# Patient Record
(demographics unavailable — no encounter records)

---

## 2024-12-03 NOTE — DISCUSSION/SUMMARY
[FreeTextEntry1] : Patient has medical history detailed above and active medical issues including:  - HFrEF, LVEF 38% echo July 2024, on GDMT followed by CHF team  - Dyspnea on exertion, abnormal EKG, multiple CAD risk factors.  Moderate ischemia anterior anterolateral with reduced LVEF.  Nonischemic catheterization May 2024  - Hypertension, BP at guideline goal on Coreg, Entresto.  Patient has high co-pay on Entresto and will discuss with CHF team next OV in 1 week  - Mildly enlarged proximal abdominal aorta 3.3 cm.   - Metastatic prostate CA with CKD followed by nephrologist Dr. Love and oncologist.  - Incidental finding thyroid nodule, thyroid ultrasound and endocrine follow-up Dr. Amaya ordered.  Advised patient to follow active lifestyle with regular cardiovascular exercise. Patient educated on heart healthy diet. Recommend increased oral hydration with electrolyte supplement drinks, avoid excess alcohol and caffeine.  Patient is aware to call with any symptoms or concerns.  Patient will be seen in cardiology follow-up 6 months   Junito will follow-up with Dr. Biju Palomo for primary care.  Total time spent 45 minutes, reviewing of test results, chart information, patient discussion, physical exam and completion of chart documentation.

## 2024-12-03 NOTE — REASON FOR VISIT
[Other: ____] : [unfilled] [FreeTextEntry1] : Junito has a past medical history of HTN on amlodipine, hypothyroid on Synthroid, metastatic prostate CA history of CyberKnife prostate surgery 2007, JARON 2005, nonischemic CMP LVEF 30-35% April 2024, nonobstructive catheterization May 2024  Seen in the office today with his wife who is able to confirm accurate history.  Patient is a good historian.  No history of CAD, MI, revascularization, VHD, CHF, TIA, CVA, diabetes, PVD, DVT, PE, arrhythmia, AF.  Mild pedal edema improved off of amlodipine.  Patient has dyspnea with moderate exertion.  Cardiovascular review of symptoms is negative for exertional chest pain, dyspnea, palpitations, dizziness or syncope.  No PND or orthopnea.  No bleeding or black stool.  Gait abnormality limited ambulation with a walker or cane.   Cardiac catheterization May 2024 nonobstructive coronaries  Lexiscan Myoview stress test April 2024 LVEF 41%, moderate ischemia anterior and anterior lateral walls  Echocardiogram April 2024 LVEF 30-35%, mild to moderate AS and MR.  Carotid and abdominal ultrasound April 2024, mild nonobstructive plaque, proximal abdominal aortic size 3.3 cm, thyroid nodule seen, thyroid ultrasound and endocrine follow-up ordered.  EKG March 2024 sinus rhythm RBBB occasional PVC

## 2024-12-03 NOTE — PHYSICAL EXAM

## 2024-12-06 NOTE — ADDENDUM
[FreeTextEntry1] : 9/5/2024 Patient called office informing us of a [place] on 9[place]10/24 [place] [facility] [place] Based on my visit from 8/7/24 with patient, he is optimized from a cardiovascular perspective. Risk not attenuated with further CV testing.  Prior testing as outlined above.  He is not on any blood thinner that would need to be held.

## 2024-12-06 NOTE — ASSESSMENT
[FreeTextEntry1] : 84 yo M with HFrEF 2/2 NICM, CKD, HTN, nonobstructive CAD, hypothyroidism, and hx of metastatic prostate cancer (maintained on enzalutamide) presents today for follow-up.  He has ACC/AHA stage C cardiomyopathy with NYHA class I-II symptoms.

## 2024-12-06 NOTE — DISCUSSION/SUMMARY
[FreeTextEntry1] : # HFrEF   - ETIOLOGY: -Nonischemic cardiomyopathy as per [facility] on 5/10/2024. -mild AS based on  SE with 30mcg max JOAN of 1.7cm2 of Vmax 2.2m/s and MG 8.4mmHg. - AL labs: K/L ratio of 1.63, no M spike, JAYNA not indicated indicated.   -PYP amyloid scan with equivocal results-grade 2 uptake, H: CL ratio of 1.45.   -Cardiac MRI ordered today. -Genetic screening for hereditary TTR amyloid sent today.   - GDMT: - continue on coreg 12.5mg BID and Jardiance 10 mg daily. - INCREASE Entresto to 49-51mg BID.   - would like to add on MRA, if renal function permits.  - DIURETICS: Euvolemic on exam.  Continue off of standing diuretics.   - LABS: -24: K4.7, Cr 2.28, eGFR 28, NT proBNP 13,435. -Needs repeat blood work, patient notes he is going for labs later this week with his oncologist.   - DEVICE: None in place.  Echocardiogram with LV function improvement from 30 to 38%.  Has not been typically considered a meaningful change.  Will evaluate LV function closer with cardiac MRI.  We are still uptitrating his GDMT.  Of note, patient has an RBBB with a CT interval of 216.  This is borderline criteria for consideration of a CRT-D.   - EDUCATION: HF education provided including lifestyle changes (low Na diet, fluid restriction, increase physical activity), current clinical condition, natural progression and prognosis.  F/U with me after cardiac MRI.

## 2024-12-06 NOTE — CARDIOLOGY SUMMARY
[de-identified] : 6/17/2024: NSR, HR 60, RBBB, . [de-identified] :  SE 7/3/24: LVEF 30% at rest and peak, at 30mcg JOAN 1.7cm with MG 8mmHg, Vmax 2.2. REst: V max 1.6, MG 4.9mmHG JOAN 1.5cm2. Nuclear SPECT 24: LVEF 41%, med sized mild-mod anterior - anterolateral wall reversible defect. [de-identified] : 7/26/2024: LVEF 38%, LVEDD 6.3 cm, IVSd 1.2 cm, PWD 1.5 cm, normal RV, severe LAE, mild TR, PASP 47 mmHg, mild left pleural effusion. 4/16/2024: LVEF 30 to 35%, LVEDD 6.3 cm, IVSD 1.3 cm, PWD 1.3 cm, severe LAE, mild-mod YUDY, mild to mod AS, low-flow low gradient AS (SV 48.7mL, SVi 22.21mL/m2), Vmax 1.7, MG 7.5mmHg, JOAN 1.4, CI 0.44, mild - mod TR, PASP 41mmHg, mild- mod MR. [de-identified] : PYP amyloid scan 7/24/2024: Grade 2 uptake, H: CL ratio of 1.45. [de-identified] : [facility] 5/10/2024: Luminal irregularities, no obstructive disease.  LVEDP 20 mmHg.

## 2024-12-06 NOTE — HISTORY OF PRESENT ILLNESS
[FreeTextEntry1] : 84 yo M with HFrEF 2/2 NICM, CKD, HTN, nonobstructive CAD, hypothyroidism, and hx of metastatic prostate cancer (maintained on enzalutamide) presents today for follow-up.  Patient notes that for the preceding 3 to 4 months he began feeling quite short of breath on getting into bed with subsequent orthopnea.  This was accompanied by mild pedal edema.  He was seen by Dr. Valentino and taken off amlodipine.  Further cardiac workup revealed echocardiogram in April 2024 with depressed ejection fraction of 30 to 35% with mild to moderate AS concerning for low-flow AS and mild to moderate MR.  Subsequent nuclear SPECT showed moderate ischemia in the anterior and anterolateral walls.  He subsequently was referred for cardiac catheterization on 5/10/2024.  This revealed luminal irregularities with no obstructive disease and elevated LVEDP of 20 mmHg.  He was started on Jardiance 10 mg daily.  He was referred to heart failure team for additional evaluation of nonischemic cardiomyopathy.  Dobutamine stress echo was done on 7/3/24 that showed fixed mild AS with LVEF of 30%.     I last saw him on 7/11/2024.  Started him on Entresto 24-26 mg twice daily.  He also underwent amyloid work up.  PYP scan on 7/24/24 with equivocal results of H: CL ratio of 1.45 and grade 2 uptake.    Today he reports that he is symptom-free.  He no longer has orthopnea and his lower extremity edema is completely resolved.  At baseline, he walks with a walker and has no difficulty walking around a supermarket.  He does have underlying spinal stenosis.  He denies carpal tunnel as well as neuropathy.  He denies chest pain, palpitations, dyspnea at rest or exertion, orthopnea, leg swelling, changes in appetite, changes in weight, bendopnea, lightheadedness, dizziness, and syncope/pre-syncope.  Not checking his BP or weight.  Office weight stable 170 lbs.

## 2024-12-06 NOTE — PHYSICAL EXAM
[Normal S1, S2] : normal S1, S2 [Normal] : soft, non-tender, no masses/organomegaly, normal bowel sounds [No Edema] : no edema [No Rash] : no rash [Moves all extremities] : moves all extremities [Alert and Oriented] : alert and oriented [de-identified] : JVP at clavicle. [de-identified] : no murmur [de-identified] : in wheelchair today [de-identified] : warm

## 2025-01-13 NOTE — PHYSICAL EXAM
[Normal S1, S2] : normal S1, S2 [Normal] : soft, non-tender, no masses/organomegaly, normal bowel sounds [No Edema] : no edema [No Rash] : no rash [Moves all extremities] : moves all extremities [Alert and Oriented] : alert and oriented [de-identified] : JVP at clavicle. [de-identified] : no murmur [de-identified] : in wheelchair today [de-identified] : warm

## 2025-01-13 NOTE — HISTORY OF PRESENT ILLNESS
[FreeTextEntry1] : 85 yo M with HFrEF 2/2 NICM, CKD, HTN, nonobstructive CAD, hypothyroidism, and hx of metastatic prostate cancer (maintained on enzalutamide) presents today for follow-up.  Patient notes that for the preceding 3 to 4 months he began feeling quite short of breath on getting into bed with subsequent orthopnea.  This was accompanied by mild pedal edema.  He was seen by Dr. Valentino and taken off amlodipine.  Further cardiac workup revealed echocardiogram in April 2024 with depressed ejection fraction of 30 to 35% with mild to moderate AS concerning for low-flow AS and mild to moderate MR.  Subsequent nuclear SPECT showed moderate ischemia in the anterior and anterolateral walls.  He subsequently was referred for cardiac catheterization on 5/10/2024.  This revealed luminal irregularities with no obstructive disease and elevated LVEDP of 20 mmHg.  He was started on Jardiance 10 mg daily.  He was referred to heart failure team for additional evaluation of nonischemic cardiomyopathy.  Dobutamine stress echo was done on 7/3/24 that showed fixed mild AS with LVEF of 30%.     I last saw him on 7/11/2024.  Started him on Entresto 24-26 mg twice daily.  He also underwent amyloid work up.  PYP scan on 7/24/24 with equivocal results of H: CL ratio of 1.45 and grade 2 uptake.    Today he reports that he is symptom-free.  He no longer has orthopnea and his lower extremity edema is completely resolved.  At baseline, he walks with a walker and has no difficulty walking around a supermarket.  He does have underlying spinal stenosis.  He denies carpal tunnel as well as neuropathy.  He denies chest pain, palpitations, dyspnea at rest or exertion, orthopnea, leg swelling, changes in appetite, changes in weight, bendopnea, lightheadedness, dizziness, and syncope/pre-syncope.  Not checking his BP or weight.  Office weight stable 170 lbs.

## 2025-04-10 NOTE — PHYSICAL EXAM
[General Appearance - Alert] : alert [General Appearance - In No Acute Distress] : in no acute distress [Sclera] : the sclera and conjunctiva were normal [PERRL With Normal Accommodation] : pupils were equal in size, round, and reactive to light [Extraocular Movements] : extraocular movements were intact [Outer Ear] : the ears and nose were normal in appearance [Oropharynx] : the oropharynx was normal [Neck Appearance] : the appearance of the neck was normal [Neck Cervical Mass (___cm)] : no neck mass was observed [Jugular Venous Distention Increased] : there was no jugular-venous distention [Thyroid Diffuse Enlargement] : the thyroid was not enlarged [Thyroid Nodule] : there were no palpable thyroid nodules [Auscultation Breath Sounds / Voice Sounds] : lungs were clear to auscultation bilaterally [Heart Rate And Rhythm] : heart rate was normal and rhythm regular [Heart Sounds] : normal S1 and S2 [Heart Sounds Gallop] : no gallops [Murmurs] : no murmurs [Heart Sounds Pericardial Friction Rub] : no pericardial rub [Bowel Sounds] : normal bowel sounds [Abdomen Soft] : soft [Abdomen Tenderness] : non-tender [] : no hepato-splenomegaly [Abdomen Mass (___ Cm)] : no abdominal mass palpated [Oriented To Time, Place, And Person] : oriented to person, place, and time [Impaired Insight] : insight and judgment were intact [Affect] : the affect was normal [FreeTextEntry1] : dry

## 2025-04-10 NOTE — ASSESSMENT
[FreeTextEntry1] : 1) CKD IV 2) UTI; MRSA 3) HTN 4) CHF  Maintain on current regimen; entresto; carvedilol; farxiga; flomax On xtani for prostate ca On vanco until May per ID Will refer to Dr Chaudhry for urology Will get vascular US doppler for LE r/o DVT being done now in office Proteinuria in setting of CKD; will worsen with UTI;   RTC 4 weeks

## 2025-04-10 NOTE — HISTORY OF PRESENT ILLNESS
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 Presenting to office with Cr of 2; at his baseline. Has worsening R LE swelling which is new in the last 24-48 hours per wife. Chronic crowell in place; saw Dr. Wick from urology however requesting second opinion.

## 2025-05-13 NOTE — HISTORY OF PRESENT ILLNESS
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 Presenting to office with Cr of 2; at his baseline. Has worsening R LE swelling which is new in the last 24-48 hours per wife. Chronic crowell in place; saw Dr. Wick from urology however requesting second opinion.   May 2025: how long has he had the catheter for? In March 2025 had nitrite +++ in urine  Xtandi for how long? follows with oncology?

## 2025-05-28 NOTE — HISTORY OF PRESENT ILLNESS
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 Presenting to office with Cr of 2; at his baseline. Has worsening R LE swelling which is new in the last 24-48 hours per wife. Chronic crowell in place; saw Dr. Wick from urology however requesting second opinion.  Current: pt seen/examined; This visit is a hospital follow up, pt contacted our office within 48hrs of discharge and was seen in the office within 7 days of discharge on 05/23/25. Recent hospitalization at [facility] [place] for UTI, hydronephrosis, ureteral stent placement by urology. Pt had IRIS on CKD IV during admission. Pt follows with Dr. Chaudhry for urology currently. At present, pt is currently residing at [facility], accompanied by pt wife Lidia today. Pt is wheelchair bound, AAOx3 confused to place, presenting clinically dehydrated, dry mucus membranes, shriveling of b/l le, hypotensive in 90s, with muscle twitching on RUE. Pt states he has been having persistent nausea since hospitalization, pt states and his wife confirms that he is not eating due to the nausea and drinking minimally.

## 2025-05-28 NOTE — ASSESSMENT
[FreeTextEntry1] : 1) CKD IV 2) UTI; MRSA 3) HTN 4) CHF  Recent [facility] admission for IRIS on CKD IV in setting of UTI, hydronephrosis;  SCr peaked at 3.6, downtrended to 2.4 at time of discharge;  Entresto and farxiga stopped on admission to [facility] for IRIS on CKD IV;  Labs drawn today, high suspicion for recurrent IRIS on CKD this time pre-renal in setting of dehydration Will contact pt wife tomorrow AM with lab results, pt may require IV hydration;  [facility] is managing pt medications, wife believes he is receiving everything the same during the hospitalization;   RTC 1 week